# Patient Record
Sex: FEMALE | Race: WHITE | URBAN - METROPOLITAN AREA
[De-identification: names, ages, dates, MRNs, and addresses within clinical notes are randomized per-mention and may not be internally consistent; named-entity substitution may affect disease eponyms.]

---

## 2021-08-09 ENCOUNTER — OFFICE VISIT (OUTPATIENT)
Dept: URBAN - METROPOLITAN AREA CLINIC 89 | Facility: CLINIC | Age: 71
End: 2021-08-09
Payer: MEDICARE

## 2021-08-09 DIAGNOSIS — Z96.1 PRESENCE OF INTRAOCULAR LENS: Primary | ICD-10-CM

## 2021-08-09 DIAGNOSIS — H53.481 GENERALIZED CONTRACTION OF VISUAL FIELD, RIGHT EYE: ICD-10-CM

## 2021-08-09 DIAGNOSIS — H52.223 REGULAR ASTIGMATISM, BILATERAL: ICD-10-CM

## 2021-08-09 PROCEDURE — 92004 COMPRE OPH EXAM NEW PT 1/>: CPT | Performed by: OPTOMETRIST

## 2021-08-09 PROCEDURE — 92083 EXTENDED VISUAL FIELD XM: CPT | Performed by: OPTOMETRIST

## 2021-08-09 ASSESSMENT — VISUAL ACUITY
OS: 20/30
OD: 20/30

## 2021-08-09 ASSESSMENT — INTRAOCULAR PRESSURE
OS: 18
OD: 16

## 2021-08-09 NOTE — IMPRESSION/PLAN
Impression: Presence of intraocular lens: Z96.1. Plan: Well centered and placed. Refraction performed at patient request today.

## 2021-08-09 NOTE — IMPRESSION/PLAN
Impression: Regular astigmatism, bilateral: H52.223. Plan: New spectacle Rx issued and released to patient.

## 2021-08-09 NOTE — IMPRESSION/PLAN
Impression: Generalized contraction of visual field, right eye: H53.481. Plan: Advised patient that loss of vision is most likely a result of an ischemic event associated with her strokes in 2016 and that the vision will not improve.

## 2022-07-14 ENCOUNTER — OFFICE VISIT (OUTPATIENT)
Dept: URBAN - METROPOLITAN AREA CLINIC 89 | Facility: CLINIC | Age: 72
End: 2022-07-14
Payer: MEDICARE

## 2022-07-14 DIAGNOSIS — H53.481 GENERALIZED CONTRACTION OF VISUAL FIELD, RIGHT EYE: ICD-10-CM

## 2022-07-14 DIAGNOSIS — H57.11 OCULAR PAIN, RIGHT EYE: Primary | ICD-10-CM

## 2022-07-14 PROCEDURE — 99213 OFFICE O/P EST LOW 20 MIN: CPT | Performed by: OPTOMETRIST

## 2022-07-14 RX ORDER — PREDNISOLONE ACETATE 10 MG/ML
1 % SUSPENSION/ DROPS OPHTHALMIC
Qty: 5 | Refills: 0 | Status: ACTIVE
Start: 2022-07-14

## 2022-07-14 ASSESSMENT — INTRAOCULAR PRESSURE
OD: 14
OS: 18

## 2022-07-14 NOTE — IMPRESSION/PLAN
Impression: Ocular pain, right eye: H57.11. Plan: Adnexa appears quite inflamed. Start prednisolone acetate 1% QID OD x 7 days.   RTC f/u 3week

## 2022-07-28 ENCOUNTER — OFFICE VISIT (OUTPATIENT)
Dept: URBAN - METROPOLITAN AREA CLINIC 89 | Facility: CLINIC | Age: 72
End: 2022-07-28
Payer: MEDICARE

## 2022-07-28 DIAGNOSIS — H53.481 GENERALIZED CONTRACTION OF VISUAL FIELD, RIGHT EYE: Primary | ICD-10-CM

## 2022-07-28 PROCEDURE — 99212 OFFICE O/P EST SF 10 MIN: CPT | Performed by: OPTOMETRIST

## 2022-07-28 ASSESSMENT — INTRAOCULAR PRESSURE
OS: 18
OD: 15

## 2022-07-28 NOTE — IMPRESSION/PLAN
Impression: Generalized contraction of visual field, right eye: H53.481. Plan: Patient reports for VF test fu and while the initial complaint is subjective restriction of the right eye s/p CVA, it is the left eye that shows a general constriction. Patient has dilated appointment scheduled for 8/10. Will bring patient in sooner for dilation and RNFL OCT.

## 2022-08-04 ENCOUNTER — OFFICE VISIT (OUTPATIENT)
Dept: URBAN - METROPOLITAN AREA CLINIC 89 | Facility: CLINIC | Age: 72
End: 2022-08-04
Payer: MEDICARE

## 2022-08-04 DIAGNOSIS — H43.813 VITREOUS DEGENERATION, BILATERAL: ICD-10-CM

## 2022-08-04 DIAGNOSIS — H43.391 OTHER VITREOUS OPACITIES, RIGHT EYE: ICD-10-CM

## 2022-08-04 DIAGNOSIS — H53.482 GENERALIZED CONTRACTION OF VISUAL FIELD, LEFT EYE: Primary | ICD-10-CM

## 2022-08-04 DIAGNOSIS — Z96.1 PRESENCE OF INTRAOCULAR LENS: ICD-10-CM

## 2022-08-04 PROCEDURE — 92133 CPTRZD OPH DX IMG PST SGM ON: CPT | Performed by: OPTOMETRIST

## 2022-08-04 PROCEDURE — 99213 OFFICE O/P EST LOW 20 MIN: CPT | Performed by: OPTOMETRIST

## 2022-08-04 NOTE — IMPRESSION/PLAN
Impression: Vitreous degeneration, bilateral: H43.813. Plan: Discussion with patient regarding posterior vitreous detachment and that while it is generally a benign condition, it can be bothersome. Patient advised of signs/symptoms associated with retinal tear/break/detachment and to [facility] ASAP.

## 2022-08-04 NOTE — IMPRESSION/PLAN
Impression: Generalized contraction of visual field, left eye: H53.482. Plan: Retinal nerve fiber layer analysis is normal today. Threshold visual field testing performed on July 22, 2022 is consistent with significant constriction of the visual field. One possible etiology is unilateral retinitis pigmentosa, however the onset of peripheral field changes are only two months and the patient does not fit the age profile. However I will refer to retina specialist for further evaluation, as well as send a report to her neurologist in [facility], as she has MRI visits pending.

## 2022-08-04 NOTE — IMPRESSION/PLAN
Impression: Other vitreous opacities, right eye: H43.391. Plan: Discussion with patient regarding floaters and that while there is no treatment, they are benign. Discussed that if floaters worsen, it can be a sign of retinal tear/break/detachment and to RTC ASAP. Otherwise RTC 1 year dilated examination.

## 2023-08-16 ENCOUNTER — OFFICE VISIT (OUTPATIENT)
Dept: URBAN - METROPOLITAN AREA CLINIC 89 | Facility: CLINIC | Age: 73
End: 2023-08-16
Payer: MEDICARE

## 2023-08-16 DIAGNOSIS — Z96.1 PRESENCE OF INTRAOCULAR LENS: ICD-10-CM

## 2023-08-16 DIAGNOSIS — H43.813 VITREOUS DEGENERATION, BILATERAL: ICD-10-CM

## 2023-08-16 DIAGNOSIS — H53.40 VISUAL FIELD DEFECT: Primary | ICD-10-CM

## 2023-08-16 PROCEDURE — 92014 COMPRE OPH EXAM EST PT 1/>: CPT | Performed by: OPTOMETRIST

## 2023-08-16 ASSESSMENT — INTRAOCULAR PRESSURE
OD: 14
OS: 20

## 2024-06-07 ENCOUNTER — OFFICE VISIT (OUTPATIENT)
Dept: URBAN - METROPOLITAN AREA CLINIC 5 | Facility: CLINIC | Age: 74
End: 2024-06-07
Payer: MEDICARE

## 2024-06-07 DIAGNOSIS — H57.12 OCULAR PAIN, LEFT EYE: Primary | ICD-10-CM

## 2024-06-07 DIAGNOSIS — H43.813 VITREOUS DEGENERATION, BILATERAL: ICD-10-CM

## 2024-06-07 DIAGNOSIS — M35.01 SICCA SYNDROME W/ KERATOCONJUNCTIVITIS: ICD-10-CM

## 2024-06-07 PROCEDURE — 92014 COMPRE OPH EXAM EST PT 1/>: CPT | Performed by: OPTOMETRIST

## 2024-06-07 RX ORDER — LOTEPREDNOL ETABONATE 2.5 MG/ML
0.25 % SUSPENSION/ DROPS OPHTHALMIC
Qty: 10 | Refills: 0 | Status: ACTIVE
Start: 2024-06-07

## 2024-06-07 ASSESSMENT — INTRAOCULAR PRESSURE
OD: 18
OS: 20

## 2025-07-22 ENCOUNTER — OFFICE VISIT (OUTPATIENT)
Dept: URBAN - METROPOLITAN AREA CLINIC 89 | Facility: CLINIC | Age: 75
End: 2025-07-22
Payer: MEDICARE

## 2025-07-22 DIAGNOSIS — H40.033 ANATOMICAL NARROW ANGLE OF BILATERAL EYE: ICD-10-CM

## 2025-07-22 DIAGNOSIS — Z96.1 PRESENCE OF INTRAOCULAR LENS: ICD-10-CM

## 2025-07-22 DIAGNOSIS — M35.01 SICCA SYNDROME W/ KERATOCONJUNCTIVITIS: Primary | ICD-10-CM

## 2025-07-22 DIAGNOSIS — H52.223 REGULAR ASTIGMATISM, BILATERAL: ICD-10-CM

## 2025-07-22 PROCEDURE — 92014 COMPRE OPH EXAM EST PT 1/>: CPT | Performed by: OPTOMETRIST

## 2025-07-22 ASSESSMENT — INTRAOCULAR PRESSURE
OS: 15
OD: 13

## 2025-07-22 ASSESSMENT — KERATOMETRY
OD: 43.45
OS: 43.20